# Patient Record
(demographics unavailable — no encounter records)

---

## 2024-10-11 NOTE — PHYSICAL EXAM
[de-identified] :  The patient appears well nourished and in no apparent distress. The patient is alert and oriented to person, place, and time. Affect and mood appear normal. The head is normocephalic and atraumatic. The eyes reveal normal sclera and extra ocular muscles are intact. The tongue is midline with no apparent lesions. Skin shows normal turgor with no evidence of eczema or psoriasis. No respiratory distress noted. Sensation grossly intact.. [de-identified] :  Exam of the left hip shows there is groin pain with range of motion The patient can perform a straight leg raise. GT bursa is nontender. 5/5 motor strength bilaterally distally. Sensation intact distally [de-identified] : MRI done by Gurdeep Shepard 8/20/24  Impression and results from my independent review in office today and radiology report: Moderate osteoarthritis of the left hip with small joint effusion. 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 Mild left-sided subiliacus bursitis. Small partial-thickness tearing of the left hamstring origin. Small left-sided fat-containing inguinal hernia Enlarged prostate gland with evidence for chronic obstructive uropathy.   X-ray: AP of the pelvis and 2 views of the left hip demonstrate severe arthritis

## 2024-10-11 NOTE — DISCUSSION/SUMMARY
[de-identified] :  CHANDRIKA RODRIGUEZ is a 67 year old male who presents with left hip severe arthritis.  Nonoperative treatment options for hip arthritis were discussed including anti-inflammatories, physical therapy, and cortisone injections. The patient has exhausted a minimum of 3 months conservative treatment including prior injections, physical therapy, over the counter NSAIDS and pain medication where indicated. In addition, the patient's quality of life is diminished due to significant chronic pain. The patient is having difficulty ambulating, descending stairs, and rising from a seated position.   Based upon the patient's continued symptoms and failure to respond to conservative treatment, I have recommended a left total hip replacement for this patient. A long discussion took place with the patient describing what a total joint replacement is and what the procedure would entail. A hip model, similar to the implants that will be used during the operation, was utilized to demonstrate the implants. Choices of implant manufactures were discussed and reviewed. The ability to secure the implant utilizing cement or cementless (press fit) fixation was discussed. Anterior and posterior exposures were discussed. For this patient an anterior approach is appropriate. The patient agrees with the plan of care as well as the use of implants.   The hospitalization and post-operative care and rehabilitation were also discussed. The use of perioperative antibiotics and DVT prophylaxis were discussed. The risk, benefits and alternatives to a surgical intervention were discussed at length with the patient. The patient was also advised of risks related to the medical comorbidities and elevated body mass index (BMI). A lengthy discussion took place to review the most common complications including but not limited to: deep vein thrombosis, pulmonary embolus, heart attack, stroke, infection, wound breakdown, numbness, damage to nerves, tendon, muscles, arteries or other blood vessels, death and other possible complications from anesthesia. The patient was told that we will take steps to minimize these risks by using sterile technique, antibiotics and DVT prophylaxis when appropriate and follow the patient postoperatively in the office setting to monitor progress. The possibility of recurrent pain, no improvement in pain and actual worsening of pain were also discussed with the patient.   The discharge plan of care focused on the patient going home following surgery. The patient was encouraged to make the necessary arrangements to have someone stay with them when they are discharged home. Following discharge, a home care nurse will visit the patient. The home care nurse will open your home care case and request home physical therapy services. Home physical therapy will commence following discharge provided it is appropriate and covered by the health insurance benefit plan.   The benefits of surgery were discussed with the patient including the potential for improving the patient's current clinical condition through operative intervention. Alternatives to surgical intervention including continued conservative management were also discussed in detail. All questions were answered to the satisfaction of the patient. The treatment plan of care, as well as a model of a total hip equivalent to the one that will be used for their total joint replacement, was shared with the patient. The patient participated and agreed to the plan of care as well as the use of the recommended implants for their total hip replacement surgery.   We are planning for robotic assistance for the total hip replacement. We discussed that this will require placement of iliac crest pins in the contralateral iliac crest for pelvic bone registration to allow for robotic guidance for the surgery. We will utilize robotic assistance to improve accuracy of the implants, and accurate restoration of both leg lengths and femoral offset.

## 2024-10-11 NOTE — HISTORY OF PRESENT ILLNESS
[de-identified] : Patient is a 67-year-old male presenting for evaluation of 3 months of left hip pain now worsening.  His left hip pain is localized primarily to the groin and lateral aspect of the left hip.  He notes pain is worse with weightbearing activity including walking long distance and rising from seated position.  He also has difficulty putting on socks and shoes due to stiffness of the hip.  Patient has been seeing pain management who recently did epidural injections which helped his back but did not do any thing for the hip pain.  He has not had any intra-articular injections to the left hip.  He has tried physical therapy and anti-inflammatories without significant relief.  He had a recent MRI revealing moderate to advanced osteoarthritis of the left hip.

## 2025-03-07 NOTE — HISTORY OF PRESENT ILLNESS
[de-identified] : S/P Left robotic assisted anterior THR with NAZIA, DOS: 2/12/25 [de-identified] :  CHANDRIKA RODRIGUEZ is doing well 3 week s/p left total hip replacement. He has completed in home physical therapy, as well as a home exercise program daily. His pain level is controlled. He is taking aspirin 81 mg twice a day for DVT prophylaxis. Overall, he is very happy with the results of the surgery so far. [de-identified] :  Exam of the left hip shows a well healing incision, hip flexion of 100 degrees, hip external rotation of 40 degrees. 5/5 motor strength bilaterally distally. Sensation intact distally [de-identified] :  X-ray: AP of the pelvis and 2 views of the left hip demonstrate a left total hip arthroplasty in stable position, with no evidence of fracture, loosening, or dislocation. [de-identified] : The patient is doing very well 3 weeks after left anterior total hip replacement. The patient will be transitioned to outpatient physical therapy and a prescription was given for that. The patient will take aspirin 81 mg twice per day for DVT prophylaxis for the next three weeks. Anterior hip precautions were reinforced. Overall the patient is very happy with their outcome so far. Followup in 3 weeks with repeat x-rays.

## 2025-03-26 NOTE — HISTORY OF PRESENT ILLNESS
[de-identified] : S/P Left robotic assisted anterior THR with NAZIA, DOS: 2/12/25. [de-identified] : He is doing well s/p left total hip replacement. He continues to go to outpatient PT which is going well. He is taking nothing for pain and pain level is controlled. He completed taking aspirin for DVT ppx. Patient denies any fevers chills or constitutional symptoms. Patient denies any falls or Trauma. Overall patient is doing well.  [de-identified] : Exam left hip: Skin reveals well-healed incision without signs of infection.  Straight leg raise is smooth and intact.  Flexion to 100 degrees, external rotation to 40 degrees all without pain.  Sensations intact.  Strength 5/5.  [de-identified] : X-ray 2 views left hip and AP pelvis demonstrate well fixed and aligned left total hip replacement without signs of loosening or fracture.  [de-identified] : The patient is doing very well 6 wks after left anterior total hip replacement. The patient will continue outpatient physical therapy. The patient no longer needs aspirin 81mg twice per day for DVT prophylaxis. Anterior hip precautions were reinforced. Overall the patient is very happy with their outcome so far. Followup in 6 wks with repeat x-rays.

## 2025-05-09 NOTE — HISTORY OF PRESENT ILLNESS
[de-identified] : S/P Left robotic assisted anterior THR with NAZIA, DOS: 2/12/25. [de-identified] : CHANDRIKA RODRIGUEZ is a 68 year male 3 months s/p left THR. He reports completing outpatient physical therapy and transitioning to a home exercise routine. He has returned to daily activities of life without significant pain or discomfort. Overall, he is very happy with the result of the surgery. [de-identified] :  Exam of the left hip shows a well healed incision, hip flexion of 120 degrees, hip external rotation of 45 degrees, hip internal rotation of 20 degrees.  5/5 motor strength bilaterally distally. Sensation intact distally [de-identified] :  X-ray: AP of the pelvis and 2 views of the left hip demonstrate a left total hip arthroplasty in stable position, with no evidence of fracture, loosening, or dislocation. [de-identified] : The patient is doing very well 3 months following left total hip replacement. The patient will continue their home exercises. Overall he is making excellent progress and is very happy with the result so far. Dental prophylaxis was reviewed. Follow up one year post op for radiographic surveillance.